# Patient Record
Sex: FEMALE | Race: WHITE | Employment: FULL TIME | URBAN - METROPOLITAN AREA
[De-identification: names, ages, dates, MRNs, and addresses within clinical notes are randomized per-mention and may not be internally consistent; named-entity substitution may affect disease eponyms.]

---

## 2018-04-30 RX ORDER — DEXTROMETHORPHAN/PSEUDOEPHED 2.5-7.5/.8
1.2 DROPS ORAL
Status: CANCELLED | OUTPATIENT
Start: 2018-04-30

## 2018-04-30 RX ORDER — EPINEPHRINE 0.1 MG/ML
1 INJECTION INTRACARDIAC; INTRAVENOUS
Status: CANCELLED | OUTPATIENT
Start: 2018-04-30 | End: 2018-04-30

## 2018-04-30 RX ORDER — ATROPINE SULFATE 0.1 MG/ML
0.5 INJECTION INTRAVENOUS
Status: CANCELLED | OUTPATIENT
Start: 2018-04-30 | End: 2018-04-30

## 2018-05-01 ENCOUNTER — HOSPITAL ENCOUNTER (OUTPATIENT)
Age: 63
Setting detail: OUTPATIENT SURGERY
Discharge: HOME | End: 2018-05-01
Attending: INTERNAL MEDICINE | Admitting: INTERNAL MEDICINE
Payer: COMMERCIAL

## 2018-05-01 VITALS
HEART RATE: 69 BPM | TEMPERATURE: 96.9 F | BODY MASS INDEX: 26.56 KG/M2 | OXYGEN SATURATION: 98 % | SYSTOLIC BLOOD PRESSURE: 122 MMHG | DIASTOLIC BLOOD PRESSURE: 72 MMHG | RESPIRATION RATE: 18 BRPM | HEIGHT: 67 IN | WEIGHT: 169.2 LBS

## 2018-05-01 PROCEDURE — 77030013991 HC SNR POLYP ENDOSC BSC -A: Performed by: INTERNAL MEDICINE

## 2018-05-01 PROCEDURE — 74011250636 HC RX REV CODE- 250/636

## 2018-05-01 PROCEDURE — 76040000007: Performed by: INTERNAL MEDICINE

## 2018-05-01 PROCEDURE — 77030020256 HC SOL INJ NACL 0.9%  500ML: Performed by: INTERNAL MEDICINE

## 2018-05-01 PROCEDURE — G0500 MOD SEDAT ENDO SERVICE >5YRS: HCPCS | Performed by: INTERNAL MEDICINE

## 2018-05-01 PROCEDURE — 74011250636 HC RX REV CODE- 250/636: Performed by: INTERNAL MEDICINE

## 2018-05-01 PROCEDURE — 99153 MOD SED SAME PHYS/QHP EA: CPT | Performed by: INTERNAL MEDICINE

## 2018-05-01 PROCEDURE — 88305 TISSUE EXAM BY PATHOLOGIST: CPT | Performed by: INTERNAL MEDICINE

## 2018-05-01 PROCEDURE — 77030003657 HC NDL SCLER BSC -B: Performed by: INTERNAL MEDICINE

## 2018-05-01 RX ORDER — OMEPRAZOLE 20 MG/1
20 CAPSULE, DELAYED RELEASE ORAL DAILY
COMMUNITY

## 2018-05-01 RX ORDER — BISMUTH SUBSALICYLATE 262 MG
1 TABLET,CHEWABLE ORAL DAILY
COMMUNITY

## 2018-05-01 RX ORDER — NALOXONE HYDROCHLORIDE 0.4 MG/ML
0.4 INJECTION, SOLUTION INTRAMUSCULAR; INTRAVENOUS; SUBCUTANEOUS
Status: DISCONTINUED | OUTPATIENT
Start: 2018-05-01 | End: 2018-05-01 | Stop reason: HOSPADM

## 2018-05-01 RX ORDER — MIDAZOLAM HYDROCHLORIDE 1 MG/ML
.5-5 INJECTION, SOLUTION INTRAMUSCULAR; INTRAVENOUS
Status: DISCONTINUED | OUTPATIENT
Start: 2018-05-01 | End: 2018-05-01 | Stop reason: HOSPADM

## 2018-05-01 RX ORDER — FENTANYL CITRATE 50 UG/ML
100 INJECTION, SOLUTION INTRAMUSCULAR; INTRAVENOUS
Status: DISCONTINUED | OUTPATIENT
Start: 2018-05-01 | End: 2018-05-01 | Stop reason: HOSPADM

## 2018-05-01 RX ORDER — SODIUM CHLORIDE 9 MG/ML
100 INJECTION, SOLUTION INTRAVENOUS CONTINUOUS
Status: DISCONTINUED | OUTPATIENT
Start: 2018-05-01 | End: 2018-05-01 | Stop reason: HOSPADM

## 2018-05-01 RX ORDER — TRIAMTERENE/HYDROCHLOROTHIAZID 37.5-25 MG
TABLET ORAL DAILY
COMMUNITY

## 2018-05-01 RX ORDER — FLUMAZENIL 0.1 MG/ML
0.2 INJECTION INTRAVENOUS
Status: DISCONTINUED | OUTPATIENT
Start: 2018-05-01 | End: 2018-05-01 | Stop reason: HOSPADM

## 2018-05-01 RX ADMIN — SODIUM CHLORIDE 100 ML/HR: 900 INJECTION, SOLUTION INTRAVENOUS at 08:53

## 2018-05-01 NOTE — DISCHARGE INSTRUCTIONS
Nicolasa Jo  [id number]  1955    COLON DISCHARGE INSTRUCTIONS    Discomfort:  Redness at IV site- apply warm compress to area; if redness or soreness persist- contact your physician  There may be a slight amount of blood passed from the rectum  Gaseous discomfort- walking, belching will help relieve any discomfort  You may not operate a vehicle til the next day. You may not engage in an occupation involving machinery or appliances til the next day. You may not drink alcoholic beverages til the next day. DIET:   High fiber diet. ACTIVITY:  You may not  resume your normal daily activities til the next day. it is recommended that you spend the remainder of the day resting -  avoid any strenuous activity. CALL M.D.  IF ANY SIGN OF:   Increasing pain, nausea, vomiting  Abdominal distension (swelling)  New increased bleeding (oral or rectal)  Fever (chills)  Pain in chest area  Bloody discharge from nose or mouth  Shortness of breath    You may not  take any Advil, Aspirin, Ibuprofen, Motrin, Aleve, or Goodys for 7 days, ONLY  Tylenol as needed for pain. Post procedure diagnosis:  POLYPS,     Follow-up Instructions: Your follow up colonoscopy will be in 5 years. We will notify you the results of your biopsy by letter within 2 weeks. Mariann Waldrop MD  May 1, 2018     Patient armband removed and shredded    DISCHARGE SUMMARY from Nurse    PATIENT INSTRUCTIONS:    After general anesthesia or intravenous sedation, for 24 hours or while taking prescription Narcotics:  · Limit your activities  · Do not drive and operate hazardous machinery  · Do not make important personal or business decisions  · Do  not drink alcoholic beverages  · If you have not urinated within 8 hours after discharge, please contact your surgeon on call.     Report the following to your surgeon:  · Excessive pain, swelling, redness or odor of or around the surgical area  · Temperature over 100.5  · Nausea and vomiting lasting longer than 4 hours or if unable to take medications  · Any signs of decreased circulation or nerve impairment to extremity: change in color, persistent  numbness, tingling, coldness or increase pain  · Any questions    What to do at Home:  Recommended activity: Activity as tolerated and no driving for today,     If you experience any of the following symptoms as abov e, please follow up with dr. Michael Meehan. *  Please give a list of your current medications to your Primary Care Provider. *  Please update this list whenever your medications are discontinued, doses are      changed, or new medications (including over-the-counter products) are added. *  Please carry medication information at all times in case of emergency situations. These are general instructions for a healthy lifestyle:    No smoking/ No tobacco products/ Avoid exposure to second hand smoke  Surgeon General's Warning:  Quitting smoking now greatly reduces serious risk to your health. Obesity, smoking, and sedentary lifestyle greatly increases your risk for illness    A healthy diet, regular physical exercise & weight monitoring are important for maintaining a healthy lifestyle    You may be retaining fluid if you have a history of heart failure or if you experience any of the following symptoms:  Weight gain of 3 pounds or more overnight or 5 pounds in a week, increased swelling in our hands or feet or shortness of breath while lying flat in bed. Please call your doctor as soon as you notice any of these symptoms; do not wait until your next office visit. Recognize signs and symptoms of STROKE:    F-face looks uneven    A-arms unable to move or move unevenly    S-speech slurred or non-existent    T-time-call 911 as soon as signs and symptoms begin-DO NOT go       Back to bed or wait to see if you get better-TIME IS BRAIN. Warning Signs of HEART ATTACK     Call 911 if you have these symptoms:   Chest discomfort.  Most heart attacks involve discomfort in the center of the chest that lasts more than a few minutes, or that goes away and comes back. It can feel like uncomfortable pressure, squeezing, fullness, or pain.  Discomfort in other areas of the upper body. Symptoms can include pain or discomfort in one or both arms, the back, neck, jaw, or stomach.  Shortness of breath with or without chest discomfort.  Other signs may include breaking out in a cold sweat, nausea, or lightheadedness. Don't wait more than five minutes to call 911 - MINUTES MATTER! Fast action can save your life. Calling 911 is almost always the fastest way to get lifesaving treatment. Emergency Medical Services staff can begin treatment when they arrive -- up to an hour sooner than if someone gets to the hospital by car. The discharge information has been reviewed with the patient and spouse. The patient and spouse verbalized understanding. Discharge medications reviewed with the patient and spouse and appropriate educational materials and side effects teaching were provided.   ___________________________________________________________________________________________________________________________________

## 2018-05-01 NOTE — PROCEDURES
[facility]  Colonoscopy Procedure Report  _______________________________________________________  Patient: Uri Aranda                                         Attending Physician: Valentin Ty MD    Patient ID: [id number]                                      Referring Physician: Tanya Blank MD    Exam Date: May 1, 2018 _______________________________________________________      Introduction: A  63 y.o. female patient, presents for outpatient Colonoscopy    Indications: 62 y/o female referred by Dr. Radu Verde for her first colon cancer screening. M-Colon CA,in late 68's F-Colon CA age [de-identified]. she is asymptomatic. Consent: The benefits, risks, and alternatives to the procedure were discussed and informed consent was obtained from the patient. Preparation: EKG, pulse, pulse oximetry and blood pressure were monitored throughout the procedure. ASA Classification: Class 2 - . The heart is an S1-S2 and regular heart rate and rhythm. Lungs are clear to auscultation and percussion. Abdomen is soft, nondistended, and nontender. Mental Status: awake, alert, and oriented to person, place, and time    Medications:  · Fentanyl 100 mcg IV before procedure. · Versed 4 mg IV throughout the procedure. Rectal Exam: Anterior anal skin tags. Normal Rectal Exam. No Blood. Pathology Specimens: One specimens removed. Procedure: The colonoscope was passed with ease through the anus under direct visualization and advanced to the cecum and 15 cm inside the terminal ileum. The patient required positioning on the back to aid in the passage of the scope. The scope was withdrawn and the mucosa was carefully examined. The quality of the preparation was excellent. The views were excellent. The patient's toleration of the procedure was excellent. Retroflexion was preformed in the ascending colon and hepatic flexure. The exam was done twice to the cecum.  Total time is minutes and withdrawal time is 16 minutes. Findings:    Rectum:   Small internal hemorrhoids. Sigmoid:   Slightly tortuous sigmoid colon. Descending Colon:   Normal  Transverse Colon:   Normal  Ascending Colon:   13 mm flat smooth polyp in the proximal ascending colon seen on retroflexion, hot snared after saline injection  Cecum:   Normal  Terminal Ileum:   Normal      Unplanned Events: There were no unplanned events. Estimated Blood Loss: Minimal  Impressions:  Anterior anal skin tags. Small internal hemorrhoids. Slightly tortuous sigmoid colon. 13 mm flat smooth polyp in the proximal ascending colon seen on retroflexion, hot snared after saline injection. Normal Mucosa. No diverticula found. Complications: None; patient tolerated the procedure well. Recommendations:  · Discharge home when standard parameters are met. · Resume a high fiber diet. · Colonoscopy recommendation in 5 years.     Procedure Codes:    · Angelica Marie [[id number]]    Endoscope Information:  Model Number(s)    [id number]     Assistant: None    Signed By: Kermit Swift MD Date: May 1, 2018

## 2018-05-01 NOTE — IP AVS SNAPSHOT
[address] [facility] 
 
 
 [address] [address] 
[phone] Patient: Riaz Bucio MRN: [id number] :1955 About your hospitalization You were admitted on:  May 1, 2018 You last received care in the:  [facility] ENDOSCOPY You were discharged on:  May 1, 2018 Why you were hospitalized Your primary diagnosis was:  Not on File Follow-up Information Follow up With Details Comments Contact Info Diana Trivedi MD   [address] 
[address] [address] 
[phone] Jamia Owens MD   [address] [facility] [address] [place] [address] 
[phone] Discharge Orders None A check pastor indicates which time of day the medication should be taken. My Medications CONTINUE taking these medications Instructions Each Dose to Equal  
 Morning Noon Evening Bedtime  
 multivitamin tablet Commonly known as:  ONE A DAY Your last dose was: Your next dose is: Take 1 Tab by mouth daily. 1 Tab  
    
   
   
   
  
 omeprazole 20 mg capsule Commonly known as:  PRILOSEC Your last dose was: Your next dose is: Take 20 mg by mouth daily. 20 mg  
    
   
   
   
  
 OTHER Your last dose was: Your next dose is:    
   
   
 Indications: ultra joint pain relief  
     
   
   
   
  
 triamterene-hydroCHLOROthiazide 37.5-25 mg per tablet Commonly known as:  Chacorta [place] Your last dose was: Your next dose is: Take  by mouth daily. Discharge Instructions Riaz Bucio [id number] 
1955 COLON DISCHARGE INSTRUCTIONS Discomfort: 
Redness at IV site- apply warm compress to area; if redness or soreness persist- contact your physician There may be a slight amount of blood passed from the rectum Gaseous discomfort- walking, belching will help relieve any discomfort You may not operate a vehicle til the next day. You may not engage in an occupation involving machinery or appliances til the next day. You may not drink alcoholic beverages til the next day. DIET: 
 High fiber diet. ACTIVITY: 
You may not  resume your normal daily activities til the next day. it is recommended that you spend the remainder of the day resting -  avoid any strenuous activity. CALL SHAQUILLE Parson ANY SIGN OF: Increasing pain, nausea, vomiting Abdominal distension (swelling) New increased bleeding (oral or rectal) Fever (chills) Pain in chest area Bloody discharge from nose or mouth Shortness of breath You may not  take any Advil, Aspirin, Ibuprofen, Motrin, Aleve, or Goodys for 7 days, ONLY  Tylenol as needed for pain. Post procedure diagnosis:  POLYPS, Follow-up Instructions: Your follow up colonoscopy will be in 5 years. We will notify you the results of your biopsy by letter within 2 weeks. aRdha Bray MD 
May 1, 2018 Patient armband removed and shredded DISCHARGE SUMMARY from Nurse PATIENT INSTRUCTIONS: 
 
After general anesthesia or intravenous sedation, for 24 hours or while taking prescription Narcotics: · Limit your activities · Do not drive and operate hazardous machinery · Do not make important personal or business decisions · Do  not drink alcoholic beverages · If you have not urinated within 8 hours after discharge, please contact your surgeon on call. Report the following to your surgeon: 
· Excessive pain, swelling, redness or odor of or around the surgical area · Temperature over 100.5 · Nausea and vomiting lasting longer than 4 hours or if unable to take medications · Any signs of decreased circulation or nerve impairment to extremity: change in color, persistent  numbness, tingling, coldness or increase pain · Any questions What to do at Home: 
Recommended activity: Activity as tolerated and no driving for today,  
 
 If you experience any of the following symptoms as abov e, please follow up with dr. Víctor Dowd. *  Please give a list of your current medications to your Primary Care Provider. *  Please update this list whenever your medications are discontinued, doses are 
    changed, or new medications (including over-the-counter products) are added. *  Please carry medication information at all times in case of emergency situations. These are general instructions for a healthy lifestyle: No smoking/ No tobacco products/ Avoid exposure to second hand smoke Surgeon General's Warning:  Quitting smoking now greatly reduces serious risk to your health. Obesity, smoking, and sedentary lifestyle greatly increases your risk for illness A healthy diet, regular physical exercise & weight monitoring are important for maintaining a healthy lifestyle You may be retaining fluid if you have a history of heart failure or if you experience any of the following symptoms:  Weight gain of 3 pounds or more overnight or 5 pounds in a week, increased swelling in our hands or feet or shortness of breath while lying flat in bed. Please call your doctor as soon as you notice any of these symptoms; do not wait until your next office visit. Recognize signs and symptoms of STROKE: 
 
F-face looks uneven A-arms unable to move or move unevenly S-speech slurred or non-existent T-time-call 911 as soon as signs and symptoms begin-DO NOT go Back to bed or wait to see if you get better-TIME IS BRAIN. Warning Signs of HEART ATTACK Call 911 if you have these symptoms: 
? Chest discomfort. Most heart attacks involve discomfort in the center of the chest that lasts more than a few minutes, or that goes away and comes back. It can feel like uncomfortable pressure, squeezing, fullness, or pain. ? Discomfort in other areas of the upper body.  Symptoms can include pain or discomfort in one or both arms, the back, neck, jaw, or stomach. ? Shortness of breath with or without chest discomfort. ? Other signs may include breaking out in a cold sweat, nausea, or lightheadedness. Don't wait more than five minutes to call [address]! Fast action can save your life. Calling 911 is almost always the fastest way to get lifesaving treatment. Emergency Medical Services staff can begin treatment when they arrive  up to an hour sooner than if someone gets to the hospital by car. The discharge information has been reviewed with the patient and spouse. The patient and spouse verbalized understanding. Discharge medications reviewed with the patient and spouse and appropriate educational materials and side effects teaching were provided. ___________________________________________________________________________________________________________________________________ Introducing [facility]! Kalia Gonzalez introduces [facility] patient portal. Now you can access parts of your medical record, email your doctor's office, and request medication refills online. 1. In your internet browser, go to [email or web address] 2. Click on the First Time User? Click Here link in the Sign In box. You will see the New Member Sign Up page. 3. Enter your [facility] Access Code exactly as it appears below. You will not need to use this code after youve completed the sign-up process. If you do not sign up before the expiration date, you must request a new code. · [facility] Access Code: [email or web address] Expires: 7/30/2018 10:44 AM 
 
4. Enter the last four digits of your Social Security Number (xxxx) and Date of Birth (mm/dd/yyyy) as indicated and click Submit. You will be taken to the next sign-up page. 5. Create a MyChart ID. This will be your [facility] login ID and cannot be changed, so think of one that is secure and easy to remember. 6. Create a [email or web address] password. You can change your password at any time. 7. Enter your Password Reset Question and Answer. This can be used at a later time if you forget your password. 8. Enter your e-mail address. You will receive e-mail notification when new information is available in [address] 9. Click Sign Up. You can now view and download portions of your medical record. 10. Click the Download Summary menu link to download a portable copy of your medical information. If you have questions, please visit the Frequently Asked Questions section of the [email or web address] website. Remember, [email or web address] is NOT to be used for urgent needs. For medical emergencies, dial 911. Now available from your iPhone and Android! Introducing Pedro Ace As a Rudolfo Faisaly patient, I wanted to make you aware of our electronic visit tool called Pedro Surendrafin. H[facility]T Mobile Ads 24/7 allows you to connect within minutes with a medical provider 24 hours a day, seven days a week via a mobile device or tablet or logging into a secure website from your computer. You can access Pedro Ace from anywhere in the [place]. A virtual visit might be right for you when you have a simple condition and feel like you just dont want to get out of bed, or cant get away from work for an appointment, when your regular Joseolraz Maloney provider is not available (evenings, weekends or holidays), or when youre out of town and need minor care. Electronic visits cost only $49 and if the MarylouPaktor 24/7 provider determines a prescription is needed to treat your condition, one can be electronically transmitted to a nearby pharmacy*. Please take a moment to enroll today if you have not already done so. The enrollment process is free and takes just a few minutes. To enroll, please download the H[employer]T Mobile Ads 24/7 philippe to your tablet or phone, or visit [email or web address] to enroll on your computer. And, as an [address] [place] [address] patient with a Physiq account, the results of your visits will be scanned into your electronic medical record and your primary care provider will be able to view the scanned results. We urge you to continue to see your regular [employer] provider for your ongoing medical care. And while your primary care provider may not be the one available when you seek a Pedro Ace virtual visit, the peace of mind you get from getting a real diagnosis real time can be priceless. For more information on Pedro Agostofin, view our Frequently Asked Questions (FAQs) at [email or web address]. Sincerely, 
 
Adriana Ng MD 
Chief Medical Officer [employer] *:  certain medications cannot be prescribed via Pedro Bjornravinderfin Providers Seen During Your Hospitalization Provider Specialty Primary office phone Javier Winter MD Gastroenterology [phone] Your Primary Care Physician (PCP) Primary Care Physician Office Phone Office Fax Evon Mac 121 [phone] You are allergic to the following No active allergies Recent Documentation Height Weight Breastfeeding? BMI OB Status Smoking Status 1.702 m 76.7 kg No 26.5 kg/m2 Hysterectomy Never Smoker Emergency Contacts Name Discharge Info Relation Home Work Mobile Albina Martinez DISCHARGE CAREGIVER [3] Spouse [3]   [phone] Patient Belongings The following personal items are in your possession at time of discharge: 
  Dental Appliances: None  Visual Aid: Contacts Please provide this summary of care documentation to your next provider. Signatures-by signing, you are acknowledging that this After Visit Summary has been reviewed with you and you have received a copy. Patient Signature:  ____________________________________________________________ Date:  ____________________________________________________________  
  
Fayrene Retort Provider Signature:  ____________________________________________________________ Date:  ____________________________________________________________

## 2018-05-01 NOTE — H&P
This 63year old female presents for Colon Cancer Screening and GERD. History of Present Illness:  1. Colon Cancer Screening      No prior screening. Risk Factors: family history - parent, M-Colon CA andin late 66's F-Colon CA age [de-identified]. Pertinent negatives include abdominal pain, change in bowel habits, change in stool caliber, constipation, decreased appetite, diarrhea, melena, nausea, rectal bleeding, vomiting, weight gain and weight loss. Additional information: Patient has family history of colon cancer, Patient has a family history of Crohn's/colitis, No NSAID/ASA use, BM 1 to 2 times a day, no blood. Partial Hysterectomy. She denies heart disease, DM,CP, CVA and paralysis. Ibuprofen 800mg daily X 2 to 3 years for knee pain. 2.  GERD      The onset of the heartburn was 1 to 2 years ago. The severity is 1. The problem is improving. The patient reports epigastric pain. It occurs randomly. Context: treatment with PPIs. The symptoms are aggravated by after foods. The symptoms are relieved by Prilosec OTC. Pertinent negatives include chronic cough, dysphagia, melena, nausea, reflux, vomiting, weight gain and weight loss. Additional information: She does not smokes, she rarely drinks now but previously drank 4 drinks a day.                 PROBLEM LIST:  Problem Description Onset Date   Anxiety state 2012   Depressive disorder 2012   Benign essential hypertension 2012   Benign hypertension 2018   Gastric reflux 2018   Family history of hereditary nonpolyposis colon cancer 2018         PAST MEDICAL/SURGICAL HISTORY   (Detailed)    Disease/disorder Onset Date Management Date Comments     anxiety       dyslpasia of vagina       partial hysterectomy     Abnormal PAP       Chest pain       Hypertension       palpatations           Family History:  (Detailed)  Relationship Family Member Name  Age at Death Condition Onset Age Cause of Death       Family history of Hypertension  N       Family history of Cancer, colon  N       Family history of Diabetes mellitus  N   Brother    Hypertension  N   Father    Cancer, colon  N   Father    Hypertension  N   Father  Y 80 Dementia  N   Mother    Hypertension  N   Mother    Cancer, rectal  N   Sister    Hypertension  N         Social History:  (Detailed)  Tobacco use reviewed. Preferred language is [place]. Smoking status: Never smoker. SMOKING STATUS  Use Status Type Smoking Status Usage Per Day Years Used Total Pack Years   no/never  Never smoker          ALCOHOL  There is a history of alcohol use. Type: Rum. 1 drink consumed rarely. Last alcoholic drink was last month. CAFFEINE  The patient uses caffeine: soda and coffee. - 4 cups a day. Patient Status   Completed with information received for patient transitioning into care. Medication Reconciliation  Medications reconciled today.   Medication Reviewed  Adherence Medication Name Sig Desc Elsewhere Status   taking as directed Maxzide-25mg 37.5 mg-25 mg tablet take 1 tablet by oral route  every day N Verified   taking as directed ibuprofen 200 mg tablet take 1 tablet by oral route  every 6 hours as needed with food N Verified   taking as directed ibuprofen 800 mg tablet take 1 tablet by oral route 3 times every day with food N Verified   taking as directed Tamiflu 75 mg capsule take 1 capsule by oral route 2 times every day N Verified   taking as directed Ativan 0.5 mg tablet take 0.5 - 1 tablet by oral route  every 12 hours as needed N Verified   taking as directed Suprep Bowel Prep Kit 17.5 gram-3.13 gram-1.6 gram oral solution take as prescribed by physician N Verified     Medications (added, continued or stopped this visit):  Started Medication Directions Instruction Stopped   03/30/2018 Ativan 0.5 mg tablet take 0.5 - 1 tablet by oral route  every 12 hours as needed     05/04/2017 ibuprofen 200 mg tablet take 1 tablet by oral route  every 6 hours as needed with food     11/08/2017 ibuprofen 800 mg tablet take 1 tablet by oral route 3 times every day with food     11/08/2017 Maxzide-25mg 37.5 mg-25 mg tablet take 1 tablet by oral route  every day      Prilosec OTC 20 mg tablet,delayed release      04/12/2018 Suprep Bowel Prep Kit 17.5 gram-3.13 gram-1.6 gram oral solution take as prescribed by physician     11/29/2017 Tamiflu 75 mg capsule take 1 capsule by oral route 2 times every day       Allergies:  Ingredient Reaction Medication Name Comment   NO KNOWN ALLERGIES          ORDERS:  Status Lab Order Time Frame Comments   ordered GASTROENTEROLOGY PROCEDURE within 1 Month at location [address] surgeon scheduled is Milena Gilbert MD. An assistant has not been requested. suprep. Review of Systems  System Neg/Pos Details   Constitutional Negative Chills, fever, malaise, weight gain and weight loss. ENMT Negative Nasal congestion. Eyes Negative Double vision. Respiratory Negative Asthma, chronic cough and wheezing. Cardio Negative Chest pain, edema and irregular heartbeat/palpitations. GI Negative Abdominal pain, change in bowel habits, change in stool caliber, constipation, decreased appetite, diarrhea, dysphagia, heartburn, hematemesis, hematochezia, melena, nausea, rectal bleeding, reflux and vomiting.  Negative Dysuria and hematuria. Endocrine Negative Cold intolerance, heat intolerance and increased thirst.   Neuro Negative Dizziness, headache, numbness, tremors and vertigo. Psych Positive Increased stress. Psych Negative Anxiety and depression. Integumentary Negative Hives and rash. MS Negative Back pain, joint pain and myalgia. Hema/Lymph Negative Easy bleeding and easy bruising. Allergic/Immuno Positive Seasonal allergies. Allergic/Immuno Negative Contact allergy and food allergies.         Vital Signs     Height  Time ft in cm Last Measured Height Position   1:02 PM 5.0 7.50 171.45 04/12/2018 0     Weight/BSA/BMI  Time lb oz kg Context BMI kg/m2 BSA m2   1:02 .00  78.925 dressed with shoes 26.85      Date/Time Temp Pulse BP MAP (Calculated) Arterial Line 1 BP (mmHg) BP Patient Position Resp SpO2 O2 Device O2 Flow Rate (L/min) Pre/Post Ductal Weight      05/01/18 0839 96.9 °F (36.1 °C) 91 133/81 98 -- -- 18 99 % Room air -- -- 76.7 kg (169 lb 3.2 oz)             PHYSICAL EXAM:  Exam Findings Details   Constitutional Normal No acute distress. Well Nourished. Well developed. Eyes Normal General - Right: Normal, Left: Normal. Conjunctiva - Right: Normal, Left: Normal. Sclera - Right: Normal, Left: Normal. Pupil - Right: Normal, Left: Normal.   Nose/Mouth/Throat Normal Lips/teeth/gums - Normal. Tongue - Normal. Buccal mucosa - Normal. Palate & uvula - Normal.   Neck Exam Normal Inspection - Normal. Palpation - Normal. Thyroid gland - Normal. Submandibular lymph nodes - Normal.   Lymph Detail Normal Submandibular. Anterior cervical. Posterior cervical. Supraclavicular. Respiratory Normal Inspection - Normal. Auscultation - Normal. Percussion - Normal. Cough - Absent. Effort - Normal.   Cardiovascular Normal Heart rate - Regular rate. Heart sounds - Normal S1, Normal S2. Murmurs - None. Extremities - No edema. Abdomen Normal Inspection - Normal. Appliance(s) - None. Abdominal muscles - Normal. Auscultation - Normal. Percussion - Normal. Anterior palpation - Normal, No guarding, No rebound. CVA tenderness - None. Umbilicus - Normal. No abdominal tenderness. No hepatic enlargement. No splenic enlargement. No hernia. No Ascites. No palpable mass. Mccall's sign - Negative. Skin Normal Inspection - Normal.   Musculoskeletal Normal Hands - Left: Normal, Right: Normal.   Extremity Normal No cyanosis. No edema. Clubbing - Absent.    Neurological Normal Level of consciousness - Normal. Orientation - Normal. Memory - Normal. Motor - Normal. Balance & gait - Normal. Coordination - Normal. Fine motor skills - Normal. DTRs - Normal.   Psychiatric Normal Orientation - Oriented to time, place, person & situation. Not anxious. Appropriate mood and affect. Behavior appropriate for age. Sufficient fund of knowledge. Sufficient language. No memory loss. Assessment/Plan  # Detail Type Description    1. Assessment Encounter for screening colonoscopy (Z12.11). Patient Plan 64 y/o female referred by Dr. Bree Khan for colon cancer screening. M-Colon CA,in late 66's F-Colon CA age [de-identified]. she is asymptomatic. She would be having her first screening colonoscopy. I explained to her the procedure of colonoscopy and the risks involved which include but not limited to reaction to sedation, bleeding, perforation, infection or missing a lesion if bowels are not well prepped or are unusually tortuous. she agreed to proceed with the procedure and answered her questions. I gave her the Suprep to clean her bowels. I advised her to take if needed extra laxatives for few days before incase she is on the constipated side to assure adequate bowel prep.  she has HTN and had partial hysterectomy    Plan Orders She will be scheduled for GASTROENTEROLOGY PROCEDURE within 1 Month on 05/01/2018.         2. Assessment Family history of malignant neoplasm of digestive organs (Z80.0). Patient Plan Mother had colon CA,in her late 66's  father had colon CA at age [de-identified].         1. Assessment GERD w/o esophagitis (K21.9). Patient Plan onset 1 to 2 years ago, she was taking Ibuprofen 800mg daily for knee and back pain. She has since decreased the amount that she takes and started taking Omeprazole 20mg OTC which controls her symptoms well. 4. Assessment Essential (primary) hypertension (I10). Patient Plan Onset in 2012 controlled with Triamterene-HCTZ.

## (undated) DEVICE — SNARE POLYP SM W13MMXL240CM SHTH DIA2.4MM OVL FLX DISP

## (undated) DEVICE — CATH IV SAFE STR 22GX1IN BLU -- PROTECTIV PLUS

## (undated) DEVICE — TRAP SPEC COLL POLYP POLYSTYR --

## (undated) DEVICE — MAJ-1414 SINGLE USE ADPATER BIOPSY VALV: Brand: SINGLE USE ADAPTOR BIOPSY VALVE

## (undated) DEVICE — ENDO CARRY-ON PROCEDURE KIT INCLUDES ENZYMATIC SPONGE, GAUZE, BIOHAZARD LABEL, TRAY, LUBRICANT, DIRTY SCOPE LABEL, WATER LABEL, TRAY, DRAWSTRING PAD, AND DEFENDO 4-PIECE KIT.: Brand: ENDO CARRY-ON PROCEDURE KIT

## (undated) DEVICE — SPONGE GZ W4XL4IN COT 12 PLY TYP VII WVN C FLD DSGN

## (undated) DEVICE — CATH SUC CTRL PRT TRIFLO 14FR --

## (undated) DEVICE — NDL PRT INJ NSAF BLNT 18GX1.5 --

## (undated) DEVICE — KENDALL RADIOLUCENT FOAM MONITORING ELECTRODE RECTANGULAR SHAPE: Brand: KENDALL

## (undated) DEVICE — CANNULA CUSH AD W/ 14FT TBG

## (undated) DEVICE — SYR 3ML LL TIP 1/10ML GRAD --

## (undated) DEVICE — SOLUTION IV 500ML 0.9% SOD CHL FLX CONT

## (undated) DEVICE — NDL FLTR TIP 5 MIC 18GX1.5IN --

## (undated) DEVICE — SYR 5ML 1/5 GRAD LL NSAF LF --

## (undated) DEVICE — SINGLE PORT MANIFOLD: Brand: NEPTUNE 2

## (undated) DEVICE — SYRINGE 50ML E/T

## (undated) DEVICE — ERBE NESSY®PLATE 170 SPLIT; 168CM²; CABLE 3M: Brand: ERBE

## (undated) DEVICE — TRNQT TEXT 1X18IN BLU LF DISP -- CONVERT TO ITEM 362165

## (undated) DEVICE — NDL INJ SCLERO 25G 240CM -- INTERJECT M00518360 BX/5

## (undated) DEVICE — SPONGE GZ W4XL4IN RAYON POLY 4 PLY NONWOVEN FASTER WICKING

## (undated) DEVICE — SET ADMIN 16ML TBNG L100IN 2 Y INJ SITE IV PIGGY BK DISP

## (undated) DEVICE — MEDI-VAC NON-CONDUCTIVE SUCTION TUBING: Brand: CARDINAL HEALTH

## (undated) DEVICE — WRISTBAND ID AD W2.5XL9.5CM RED VYN ADH CLSR UNI-PRINT